# Patient Record
Sex: MALE | Race: BLACK OR AFRICAN AMERICAN
[De-identification: names, ages, dates, MRNs, and addresses within clinical notes are randomized per-mention and may not be internally consistent; named-entity substitution may affect disease eponyms.]

---

## 2019-10-12 ENCOUNTER — HOSPITAL ENCOUNTER (INPATIENT)
Dept: HOSPITAL 92 - ERS | Age: 49
LOS: 4 days | Discharge: HOME | DRG: 247 | End: 2019-10-16
Attending: INTERNAL MEDICINE | Admitting: INTERNAL MEDICINE
Payer: OTHER GOVERNMENT

## 2019-10-12 VITALS — BODY MASS INDEX: 27.9 KG/M2

## 2019-10-12 DIAGNOSIS — I25.10: ICD-10-CM

## 2019-10-12 DIAGNOSIS — E78.00: ICD-10-CM

## 2019-10-12 DIAGNOSIS — Z23: ICD-10-CM

## 2019-10-12 DIAGNOSIS — I21.09: Primary | ICD-10-CM

## 2019-10-12 DIAGNOSIS — Z79.899: ICD-10-CM

## 2019-10-12 DIAGNOSIS — R03.1: ICD-10-CM

## 2019-10-12 DIAGNOSIS — I10: ICD-10-CM

## 2019-10-12 LAB
ALBUMIN SERPL BCG-MCNC: 4.1 G/DL (ref 3.5–5)
ALP SERPL-CCNC: 90 U/L (ref 40–110)
ALT SERPL W P-5'-P-CCNC: 12 U/L (ref 8–55)
ANION GAP SERPL CALC-SCNC: 13 MMOL/L (ref 10–20)
AST SERPL-CCNC: 16 U/L (ref 5–34)
BASOPHILS # BLD AUTO: 0.1 THOU/UL (ref 0–0.2)
BASOPHILS NFR BLD AUTO: 0.8 % (ref 0–1)
BILIRUB SERPL-MCNC: 1 MG/DL (ref 0.2–1.2)
BUN SERPL-MCNC: 9 MG/DL (ref 8.9–20.6)
CALCIUM SERPL-MCNC: 8.8 MG/DL (ref 7.8–10.44)
CHLORIDE SERPL-SCNC: 104 MMOL/L (ref 98–107)
CK MB SERPL-MCNC: 1.2 NG/ML (ref 0–6.6)
CK MB SERPL-MCNC: 49.8 NG/ML (ref 0–6.6)
CK SERPL-CCNC: 250 U/L (ref 30–200)
CO2 SERPL-SCNC: 25 MMOL/L (ref 22–29)
CREAT CL PREDICTED SERPL C-G-VRATE: 0 ML/MIN (ref 70–130)
EOSINOPHIL # BLD AUTO: 0.1 THOU/UL (ref 0–0.7)
EOSINOPHIL NFR BLD AUTO: 0.6 % (ref 0–10)
GLOBULIN SER CALC-MCNC: 3.4 G/DL (ref 2.4–3.5)
GLUCOSE SERPL-MCNC: 125 MG/DL (ref 70–105)
HGB BLD-MCNC: 14.2 G/DL (ref 14–18)
LYMPHOCYTES # BLD: 1.5 THOU/UL (ref 1.2–3.4)
LYMPHOCYTES NFR BLD AUTO: 19 % (ref 21–51)
MCH RBC QN AUTO: 27.8 PG (ref 27–31)
MCV RBC AUTO: 82.8 FL (ref 78–98)
MONOCYTES # BLD AUTO: 0.4 THOU/UL (ref 0.11–0.59)
MONOCYTES NFR BLD AUTO: 5.1 % (ref 0–10)
NEUTROPHILS # BLD AUTO: 6 THOU/UL (ref 1.4–6.5)
NEUTROPHILS NFR BLD AUTO: 74.4 % (ref 42–75)
PLATELET # BLD AUTO: 239 THOU/UL (ref 130–400)
POTASSIUM SERPL-SCNC: 3.1 MMOL/L (ref 3.5–5.1)
RBC # BLD AUTO: 5.09 MILL/UL (ref 4.7–6.1)
SODIUM SERPL-SCNC: 139 MMOL/L (ref 136–145)
WBC # BLD AUTO: 8 THOU/UL (ref 4.8–10.8)

## 2019-10-12 PROCEDURE — B2151ZZ FLUOROSCOPY OF LEFT HEART USING LOW OSMOLAR CONTRAST: ICD-10-PCS | Performed by: INTERNAL MEDICINE

## 2019-10-12 PROCEDURE — S0028 INJECTION, FAMOTIDINE, 20 MG: HCPCS

## 2019-10-12 PROCEDURE — 76942 ECHO GUIDE FOR BIOPSY: CPT

## 2019-10-12 PROCEDURE — 80061 LIPID PANEL: CPT

## 2019-10-12 PROCEDURE — C1769 GUIDE WIRE: HCPCS

## 2019-10-12 PROCEDURE — 93010 ELECTROCARDIOGRAM REPORT: CPT

## 2019-10-12 PROCEDURE — 93798 PHYS/QHP OP CAR RHAB W/ECG: CPT

## 2019-10-12 PROCEDURE — 027034Z DILATION OF CORONARY ARTERY, ONE ARTERY WITH DRUG-ELUTING INTRALUMINAL DEVICE, PERCUTANEOUS APPROACH: ICD-10-PCS | Performed by: INTERNAL MEDICINE

## 2019-10-12 PROCEDURE — 93005 ELECTROCARDIOGRAM TRACING: CPT

## 2019-10-12 PROCEDURE — 85347 COAGULATION TIME ACTIVATED: CPT

## 2019-10-12 PROCEDURE — 92941 PRQ TRLML REVSC TOT OCCL AMI: CPT

## 2019-10-12 PROCEDURE — 99153 MOD SED SAME PHYS/QHP EA: CPT

## 2019-10-12 PROCEDURE — 80053 COMPREHEN METABOLIC PANEL: CPT

## 2019-10-12 PROCEDURE — 93306 TTE W/DOPPLER COMPLETE: CPT

## 2019-10-12 PROCEDURE — 82550 ASSAY OF CK (CPK): CPT

## 2019-10-12 PROCEDURE — C1887 CATHETER, GUIDING: HCPCS

## 2019-10-12 PROCEDURE — C1874 STENT, COATED/COV W/DEL SYS: HCPCS

## 2019-10-12 PROCEDURE — 93458 L HRT ARTERY/VENTRICLE ANGIO: CPT

## 2019-10-12 PROCEDURE — 82553 CREATINE MB FRACTION: CPT

## 2019-10-12 PROCEDURE — 99152 MOD SED SAME PHYS/QHP 5/>YRS: CPT

## 2019-10-12 PROCEDURE — 84484 ASSAY OF TROPONIN QUANT: CPT

## 2019-10-12 PROCEDURE — 96375 TX/PRO/DX INJ NEW DRUG ADDON: CPT

## 2019-10-12 PROCEDURE — 4A023N7 MEASUREMENT OF CARDIAC SAMPLING AND PRESSURE, LEFT HEART, PERCUTANEOUS APPROACH: ICD-10-PCS | Performed by: INTERNAL MEDICINE

## 2019-10-12 PROCEDURE — 96374 THER/PROPH/DIAG INJ IV PUSH: CPT

## 2019-10-12 PROCEDURE — 02C03ZZ EXTIRPATION OF MATTER FROM CORONARY ARTERY, ONE ARTERY, PERCUTANEOUS APPROACH: ICD-10-PCS | Performed by: INTERNAL MEDICINE

## 2019-10-12 PROCEDURE — 83880 ASSAY OF NATRIURETIC PEPTIDE: CPT

## 2019-10-12 PROCEDURE — 85025 COMPLETE CBC W/AUTO DIFF WBC: CPT

## 2019-10-12 PROCEDURE — 36415 COLL VENOUS BLD VENIPUNCTURE: CPT

## 2019-10-12 PROCEDURE — B2111ZZ FLUOROSCOPY OF MULTIPLE CORONARY ARTERIES USING LOW OSMOLAR CONTRAST: ICD-10-PCS | Performed by: INTERNAL MEDICINE

## 2019-10-12 PROCEDURE — C9606 PERC D-E COR REVASC W AMI S: HCPCS

## 2019-10-12 RX ADMIN — ONDANSETRON PRN MG: 2 INJECTION INTRAMUSCULAR; INTRAVENOUS at 20:31

## 2019-10-12 NOTE — HP
REASON FOR ADMISSION:  Acute myocardial infarction.



HISTORY OF PRESENT ILLNESS:  Mr. Gould is a 48-year-old gentleman.  He was visiting

from out of town to go to the [employer] football game.  The patient

started having severe chest pain and was brought to this institution by ambulance. 



The patient was found to have ST elevation in the anterior leads with continued

chest pain, was brought to the cath lab.  He has not had any pain like this before.

He has had no previous cardiac problems. 



He has otherwise been active and healthy.  No smoking or tobacco.  No medications on

a regular basis. 



ALLERGIES:  NO ALLERGIES. 



THE PATIENT OTHERWISE HAS BEEN ACTIVE AND ASYMPTOMATIC AND QUITE HEALTHY PRIOR TO

THIS EPISODE. 



SOCIAL HISTORY:  As mentioned, no smoking or tobacco.  He works in the , it

is my understanding, and has had no limitations in his activity level. 



REVIEW OF SYSTEMS:  CONSTITUTIONAL:  No significant weight gain or loss. 

VISION:  No changes. 

HEARING:  No changes. 

PULMONARY:  No cough or wheezing. 

GASTROINTESTINAL:  No nausea, vomiting, or diarrhea. 

SKIN:  No rashes. 

NEUROLOGIC:  No unilateral weakness or numbness. 

PSYCHIATRIC:  No unusual depression or anxiety.



PHYSICAL EXAMINATION:

GENERAL:  This is apprehensive gentleman, lying very still with severe chest pain. 

VITAL SIGNS:  Blood pressure was 130 systolic, pulse 70s. 

HEENT:  Eyes, sclerae nonicteric.  Mouth, mucous membranes moist. 

NECK:  Supple.  No lymphadenopathy. 

LUNGS:  Clear.  No wheezing, rales, or rhonchi. 

CARDIAC:  Normal S1, normal S2.  There is no murmur, rub, or gallop. 

ABDOMEN:  Soft, nontender. 

EXTREMITIES:  Warm, dry.  No clubbing or cyanosis or edema.  Good peripheral pulses.



DIAGNOSTIC DATA:  EKG showed anterior ST elevation.



LABORATORY DATA:  Laboratories pending.



ASSESSMENT:  Acute anterior myocardial infarction.



PLAN:  

1. The patient was given 5000 units of heparin intravenously.

2. He was given aspirin.

3. Proceed to cardiac catheterization lab.  Discussed risk of stroke, heart attack,

loss of blood supply to leg or kidney, vessel perforation, stent thrombosis,

restenosis.  The patient understood and wished to proceed.  This was done

successfully.  Please see the procedure note. 







Job ID:  [id number]

## 2019-10-13 LAB
ALBUMIN SERPL BCG-MCNC: 4.1 G/DL (ref 3.5–5)
ALP SERPL-CCNC: 89 U/L (ref 40–110)
ALT SERPL W P-5'-P-CCNC: 23 U/L (ref 8–55)
ANION GAP SERPL CALC-SCNC: 11 MMOL/L (ref 10–20)
AST SERPL-CCNC: 118 U/L (ref 5–34)
BASOPHILS # BLD AUTO: 0 THOU/UL (ref 0–0.2)
BASOPHILS NFR BLD AUTO: 0.3 % (ref 0–1)
BILIRUB SERPL-MCNC: 1.3 MG/DL (ref 0.2–1.2)
BUN SERPL-MCNC: 7 MG/DL (ref 8.9–20.6)
CALCIUM SERPL-MCNC: 9 MG/DL (ref 7.8–10.44)
CHD RISK SERPL-RTO: 4.6 (ref ?–4.5)
CHLORIDE SERPL-SCNC: 101 MMOL/L (ref 98–107)
CHOLEST SERPL-MCNC: 209 MG/DL
CK MB SERPL-MCNC: 95.5 NG/ML (ref 0–6.6)
CO2 SERPL-SCNC: 31 MMOL/L (ref 22–29)
CREAT CL PREDICTED SERPL C-G-VRATE: 101 ML/MIN (ref 70–130)
EOSINOPHIL # BLD AUTO: 0 THOU/UL (ref 0–0.7)
EOSINOPHIL NFR BLD AUTO: 0.3 % (ref 0–10)
GLOBULIN SER CALC-MCNC: 3.3 G/DL (ref 2.4–3.5)
GLUCOSE SERPL-MCNC: 108 MG/DL (ref 70–105)
HDLC SERPL-MCNC: 45 MG/DL
HGB BLD-MCNC: 13.8 G/DL (ref 14–18)
LDLC SERPL CALC-MCNC: 144 MG/DL
LYMPHOCYTES # BLD: 1.4 THOU/UL (ref 1.2–3.4)
LYMPHOCYTES NFR BLD AUTO: 15 % (ref 21–51)
MCH RBC QN AUTO: 28 PG (ref 27–31)
MCV RBC AUTO: 82.9 FL (ref 78–98)
MONOCYTES # BLD AUTO: 0.6 THOU/UL (ref 0.11–0.59)
MONOCYTES NFR BLD AUTO: 6.4 % (ref 0–10)
NEUTROPHILS # BLD AUTO: 7.4 THOU/UL (ref 1.4–6.5)
NEUTROPHILS NFR BLD AUTO: 78.1 % (ref 42–75)
PLATELET # BLD AUTO: 236 THOU/UL (ref 130–400)
POTASSIUM SERPL-SCNC: 3.9 MMOL/L (ref 3.5–5.1)
RBC # BLD AUTO: 4.91 MILL/UL (ref 4.7–6.1)
SODIUM SERPL-SCNC: 139 MMOL/L (ref 136–145)
TRIGL SERPL-MCNC: 99 MG/DL (ref ?–150)
TROPONIN I SERPL DL<=0.01 NG/ML-MCNC: 16.82 NG/ML (ref ?–0.03)
TROPONIN I SERPL DL<=0.01 NG/ML-MCNC: 6.51 NG/ML (ref ?–0.03)
WBC # BLD AUTO: 9.5 THOU/UL (ref 4.8–10.8)

## 2019-10-13 PROCEDURE — 3E02340 INTRODUCTION OF INFLUENZA VACCINE INTO MUSCLE, PERCUTANEOUS APPROACH: ICD-10-PCS | Performed by: INTERNAL MEDICINE

## 2019-10-13 RX ADMIN — TICAGRELOR SCH MG: 90 TABLET ORAL at 09:16

## 2019-10-13 RX ADMIN — ONDANSETRON PRN MG: 2 INJECTION INTRAMUSCULAR; INTRAVENOUS at 08:29

## 2019-10-13 RX ADMIN — ASPIRIN 81 MG CHEWABLE TABLET SCH MG: 81 TABLET CHEWABLE at 09:15

## 2019-10-13 RX ADMIN — ONDANSETRON PRN MG: 2 INJECTION INTRAMUSCULAR; INTRAVENOUS at 20:31

## 2019-10-13 RX ADMIN — ONDANSETRON PRN MG: 2 INJECTION INTRAMUSCULAR; INTRAVENOUS at 14:40

## 2019-10-13 RX ADMIN — ONDANSETRON PRN MG: 2 INJECTION INTRAMUSCULAR; INTRAVENOUS at 02:58

## 2019-10-13 RX ADMIN — TICAGRELOR SCH MG: 90 TABLET ORAL at 20:33

## 2019-10-13 NOTE — CON
DATE OF CONSULTATION:  10/13/2019



SERVICE:  Pulmonary Medicine.



REASON FOR CONSULTATION:  ICU patient.



HISTORY OF PRESENT ILLNESS:  The patient is a 48-year-old  male 
with

past medical history significant for essentially nothing.  He was in his usual 
state

of health at the football game.  He had just before half time, he ended up 
having

onset of severe nausea and vomiting.  He was brought to the emergency 
department and

discovered to have an acute ST-elevation MI.  He was brought to the cath lab.  
An

LAD lesion was identified, and a stent was deployed.  He had immediate 
improvement

in symptoms, though he continues to have persistent nausea, and a vague left

shoulder discomfort, which pales in comparison to his presentation.  Overnight, 
he

has done quite well with no significant ectopy on the telemetry leads.  He has 
been

up walking with physical therapy today.  With this, he does not have any 
significant

dyspnea or syncopal episodes.  His blood pressure was a touch high on 
presentation.

As such, we are watching that today.  If he remains stable, he will be 
considered

for transition to the floor.  Otherwise, he has no complaints of vomiting, 
diarrhea,

abdominal discomfort, red or swollen joints, or rashes.  Prior to this event, 
he was

certainly in his usual state of health. 



PAST MEDICAL HISTORY:  Coronary artery disease.



PAST SURGICAL HISTORY:  

1. Left knee ACL repair.

2. Jaw surgery.

3. Percutaneous coronary intervention.



SOCIAL HISTORY:  Negative for current alcohol, tobacco, or illicit drug use.  
He is

a  in the [employer].  He has no exposure to

chemicals, dust, asbestos, or tuberculosis. 



FAMILY HISTORY:  Noncontributory.



ALLERGIES:  NO KNOWN DRUG ALLERGIES.



MEDICATIONS:  List of the inpatient medications were reviewed.  No specific 
updates

were made at this time. 



REVIEW OF SYSTEMS:  General, head, ears, eyes, nose, throat, cardiovascular,

respiratory, GI, , musculoskeletal, neurologic, and skin is negative except as

mentioned in the HPI. 



PHYSICAL EXAMINATION:

VITAL SIGNS:  Afebrile, pulse 81, blood pressure 118/91, respirations 16, and

saturation 100% on room air. 

GENERAL:  The patient is awake and alert, in no apparent distress. 

LUNGS:  Wonderful air entry.  There is no prolonged expiratory phase or wheezing

present. 

HEART:  Normal rate, regular. 

ABDOMEN:  Soft, nontender, and nondistended.  Bowel sounds are positive. 

MUSCULOSKELETAL:  No cyanosis or clubbing.  There is no pitting in bilateral 
lower

extremities. 

NEUROLOGIC:  Grossly nonfocal.



LABORATORY DATA:  WBC 9.5, hemoglobin 13.8, and platelets 236,000.  Troponin has

peaked at 16.8.  Liver function studies are unremarkable.  Total bilirubin 1.3 
and

gently up-trending.  Basic metabolic profile is otherwise unremarkable.  BNP is

below the assay limit of 10. 



ASSESSMENT:  

1. Acute myocardial infarction, status post percutaneous coronary intervention 
with

drug-eluting stent to the left anterior descending artery. 

2. Coronary artery disease.



DISCUSSION AND PLAN:  The patient is doing quite well post cardiac 
catheterization.

When he meets Cardiology's criteria, he will be considered for transition out 
of the

ICU to the telemetry unit.  No further requirements for inpatient Pulmonary or

Critical Care opinion in that location.  If he has a significant clinical

decline, please notify me. 



70 minutes have been devoted to this patient in various activities.  I 
personally reviewed all imaging studies and laboratory data noted within this 
document.  For fifty percent of this time, I was interacting with the patient 
at the bedside or coordinating care with the care team.  For the remainder of 
the time I was immediately available to the patient in the hospital unit.  





Job ID:  [id number]



MTDD

## 2019-10-13 NOTE — PRG
DATE OF SERVICE:  10/13/2019



SUBJECTIVE:  Mr. Gould feels well today.  He still had some nausea and vomiting

last night, but he has an upset stomach this morning, but better. 



OBJECTIVE:  VITAL SIGNS:  His blood pressure is variable, the most recent is

140/100; pulse 64, regular. 

LUNGS:  Clear. 

CARDIAC:  Normal S1.  Normal S2. 

ABDOMEN:  Soft and nontender. 

EXTREMITIES:  Warm and dry.  No clubbing.  No cyanosis or edema.



IMAGING DATA:  The EKG today shows some biphasic T-waves in V3, V4, and some T-wave

inversions anteriorly compatible with recovering myocardium. 



LABORATORY DATA:  The troponin went to 6.5 and 16.8, which is a relatively small

rise. 



ASSESSMENT:  

1. Status post anterior myocardial infarction treated successfully with drug-eluting

stent. 

2. Mild hypercholesterolemia.  Cholesterol .

3. Nausea related to the myocardial infarction.

4. Hypertension.



PLAN:  

1. The patient is on carvedilol, may need to increase dose depending on what the

heart rate runs. 

2. He is on lisinopril.  He is about to receive a dose.

3. Amlodipine was given last night.

4. We will start statins tonight.

5. He is on aspirin and ticagrelor.







Job ID:  [id number]

## 2019-10-14 RX ADMIN — ASPIRIN 81 MG CHEWABLE TABLET SCH MG: 81 TABLET CHEWABLE at 12:12

## 2019-10-14 RX ADMIN — TICAGRELOR SCH MG: 90 TABLET ORAL at 12:12

## 2019-10-14 RX ADMIN — TICAGRELOR SCH MG: 90 TABLET ORAL at 21:03

## 2019-10-14 NOTE — PRG
DATE OF SERVICE:  



SUBJECTIVE:  Mr. Gould is doing well.  This morning, his blood pressure was low at

90 systolic.  He is not dizzy or lightheaded now. 



He feels well.



OBJECTIVE:  VITAL SIGNS:  Pulse is 64. 

LUNGS:  Clear. 

CARDIAC:  Normal S1, normal S2. 

ABDOMEN:  Soft, nontender. 

EXTREMITIES:  Reveal no edema.



ASSESSMENT:  

1. Status post anterior myocardial infarction.

2. Relatively low blood pressure, on lisinopril and carvedilol.



PLAN:  

1. Hold lisinopril and carvedilol this morning, then change the lisinopril to 5 mg

each morning only. 

2. Echocardiogram pending.

3. Home in 1 to 2 days depending on left ventricular function.







Job ID:  [id number]

## 2019-10-15 LAB
BASOPHILS # BLD AUTO: 0 THOU/UL (ref 0–0.2)
BASOPHILS NFR BLD AUTO: 0.3 % (ref 0–1)
EOSINOPHIL # BLD AUTO: 0.1 THOU/UL (ref 0–0.7)
EOSINOPHIL NFR BLD AUTO: 2 % (ref 0–10)
HGB BLD-MCNC: 13.3 G/DL (ref 14–18)
LYMPHOCYTES # BLD: 1.9 THOU/UL (ref 1.2–3.4)
LYMPHOCYTES NFR BLD AUTO: 28.7 % (ref 21–51)
MCH RBC QN AUTO: 28.6 PG (ref 27–31)
MCV RBC AUTO: 82.4 FL (ref 78–98)
MONOCYTES # BLD AUTO: 0.6 THOU/UL (ref 0.11–0.59)
MONOCYTES NFR BLD AUTO: 9.1 % (ref 0–10)
NEUTROPHILS # BLD AUTO: 4.1 THOU/UL (ref 1.4–6.5)
NEUTROPHILS NFR BLD AUTO: 59.9 % (ref 42–75)
PLATELET # BLD AUTO: 218 THOU/UL (ref 130–400)
RBC # BLD AUTO: 4.66 MILL/UL (ref 4.7–6.1)
WBC # BLD AUTO: 6.8 THOU/UL (ref 4.8–10.8)

## 2019-10-15 RX ADMIN — TICAGRELOR SCH MG: 90 TABLET ORAL at 22:30

## 2019-10-15 RX ADMIN — ASPIRIN 81 MG CHEWABLE TABLET SCH MG: 81 TABLET CHEWABLE at 07:56

## 2019-10-15 RX ADMIN — TICAGRELOR SCH MG: 90 TABLET ORAL at 07:56

## 2019-10-15 NOTE — PRG
DATE OF SERVICE:  10/15/2019



SUBJECTIVE:  Mr. Gould is doing well.  He is up walking in the halls.  No

complaints.  No chest pain or pressure. 



OBJECTIVE:  VITAL SIGNS:  Blood pressure 102/64, pulse 70. 

LUNGS:  Clear. 

CARDIAC:  Normal S1, normal S2. 

ABDOMEN:  Soft and nontender.



ASSESSMENT:  

1. Status post anterior myocardial infarction with reperfusion.

2. Relatively low blood pressure, but stable and asymptomatic.

3. Ejection fraction 40% to 45% on echocardiogram.



PLAN:  He will be released home tomorrow on the current medical regimen.







Job ID:  [id number]

## 2019-10-16 VITALS — DIASTOLIC BLOOD PRESSURE: 75 MMHG | SYSTOLIC BLOOD PRESSURE: 120 MMHG | TEMPERATURE: 98.1 F

## 2019-10-16 RX ADMIN — ASPIRIN 81 MG CHEWABLE TABLET SCH MG: 81 TABLET CHEWABLE at 09:32

## 2019-10-16 RX ADMIN — TICAGRELOR SCH MG: 90 TABLET ORAL at 09:32

## 2019-10-16 NOTE — DIS
DATE OF ADMISSION:  10/12/2019



DATE OF DISCHARGE:  10/16/2019



FINAL DIAGNOSES:  

1. Status post acute anterior myocardial infarction.

2. Hypercholesterolemia.



MEDICATIONS AT TIME OF DISCHARGE:  

1. Aspirin 81 mg a day.

2. Crestor 40 mg a day.

3. Carvedilol 6.25 mg twice a day.

4. Lisinopril 5 mg a day.

5. Brilinta 90 mg twice a day.



FOLLOWUP:  With his physicians in [place].



HISTORY OF PRESENT ILLNESS:  Mr. Gould is a 48-year-old gentleman who was visiting

here in [place], going to the [employer] football game, when he had the

sudden onset of severe substernal chest pain.  An ambulance was called.  He was

brought to [facility] and was found to be having acute anterior myocardial

infarction.  The patient was taken to the cardiac catheterization lab, where an

emergency procedure was done with coronary stenting.  The patient had a long lesion

in his left anterior descending artery with a 99% lesion with slow antegrade flow

and ongoing chest pain. 



The patient underwent stent implantation.  As mentioned, the LAD has slow flow with

thrombus.  There is a 40% right coronary stenosis. 



A long stent was placed, 3.0 x 38 mm drug-eluting stent.  The distal vessel appeared

to be approximately 3 mm in diameter, more proximally it is much larger up to 4.

Therefore, the stent was positioned and deployed, and then the midsegment was

dilated with a 3.5 mm high-pressure balloon and the proximal segment with a 4 mm

high-pressure balloon.  The patient did well following with resolution of chest pain

with drug-coated stent.  Therefore, the patient should receive dual anti-platelet

drugs for at least one year. 



He will go home on aspirin and Brilinta.  He was given Brilinta samples for 12 days.

 All the other medicine prescriptions were given. 



PERTINENT LABORATORY DATA:  The peak troponin was only 16.8.  The peak CPK-MB was

95.5. 



Cholesterol level drawn the following morning was 209 with an , HDL 45,

triglyceride 99. 



Echocardiogram done yesterday showed ejection fraction 40% to 45%.  The anterior

wall apex and mid and distal septum were all hypokinetic.  The patient will be

released home to be followed up by his physicians in [place]. 







Job ID:  [id number]